# Patient Record
Sex: FEMALE | Race: WHITE | NOT HISPANIC OR LATINO | Employment: OTHER | URBAN - METROPOLITAN AREA
[De-identification: names, ages, dates, MRNs, and addresses within clinical notes are randomized per-mention and may not be internally consistent; named-entity substitution may affect disease eponyms.]

---

## 2024-08-18 ENCOUNTER — DOCUMENTATION ONLY (OUTPATIENT)
Dept: OTHER | Facility: CLINIC | Age: 39
End: 2024-08-18
Payer: MEDICARE

## 2024-09-11 ENCOUNTER — ANESTHESIA EVENT (OUTPATIENT)
Dept: SURGERY | Facility: CLINIC | Age: 39
End: 2024-09-11
Payer: MEDICARE

## 2024-09-11 RX ORDER — ESTRADIOL 1 MG/1
1 TABLET ORAL DAILY
COMMUNITY

## 2024-09-11 RX ORDER — PROGESTERONE 100 MG/1
100 CAPSULE ORAL DAILY
COMMUNITY

## 2024-09-11 RX ORDER — POLYETHYLENE GLYCOL 3350 17 G/17G
1 POWDER, FOR SOLUTION ORAL DAILY
COMMUNITY

## 2024-09-11 RX ORDER — FLUOXETINE 40 MG/1
40 CAPSULE ORAL DAILY
COMMUNITY

## 2024-09-11 RX ORDER — RISPERIDONE 1 MG/1
1 TABLET, ORALLY DISINTEGRATING ORAL 2 TIMES DAILY
COMMUNITY

## 2024-09-11 ASSESSMENT — ENCOUNTER SYMPTOMS: SEIZURES: 1

## 2024-09-12 RX ORDER — FENTANYL CITRATE 50 UG/ML
25 INJECTION, SOLUTION INTRAMUSCULAR; INTRAVENOUS EVERY 5 MIN PRN
Status: CANCELLED | OUTPATIENT
Start: 2024-09-12

## 2024-09-12 RX ORDER — ONDANSETRON 4 MG/1
4 TABLET, ORALLY DISINTEGRATING ORAL EVERY 30 MIN PRN
Status: CANCELLED | OUTPATIENT
Start: 2024-09-12

## 2024-09-12 RX ORDER — OXYCODONE HYDROCHLORIDE 5 MG/1
10 TABLET ORAL
Status: CANCELLED | OUTPATIENT
Start: 2024-09-12

## 2024-09-12 RX ORDER — DEXAMETHASONE SODIUM PHOSPHATE 4 MG/ML
4 INJECTION, SOLUTION INTRA-ARTICULAR; INTRALESIONAL; INTRAMUSCULAR; INTRAVENOUS; SOFT TISSUE
Status: CANCELLED | OUTPATIENT
Start: 2024-09-12

## 2024-09-12 RX ORDER — HYDROMORPHONE HYDROCHLORIDE 1 MG/ML
0.2 INJECTION, SOLUTION INTRAMUSCULAR; INTRAVENOUS; SUBCUTANEOUS EVERY 5 MIN PRN
Status: CANCELLED | OUTPATIENT
Start: 2024-09-12

## 2024-09-12 RX ORDER — NALOXONE HYDROCHLORIDE 0.4 MG/ML
0.1 INJECTION, SOLUTION INTRAMUSCULAR; INTRAVENOUS; SUBCUTANEOUS
Status: CANCELLED | OUTPATIENT
Start: 2024-09-12

## 2024-09-12 RX ORDER — OXYCODONE HYDROCHLORIDE 5 MG/1
5 TABLET ORAL
Status: CANCELLED | OUTPATIENT
Start: 2024-09-12

## 2024-09-12 RX ORDER — SODIUM CHLORIDE, SODIUM LACTATE, POTASSIUM CHLORIDE, CALCIUM CHLORIDE 600; 310; 30; 20 MG/100ML; MG/100ML; MG/100ML; MG/100ML
INJECTION, SOLUTION INTRAVENOUS CONTINUOUS
Status: CANCELLED | OUTPATIENT
Start: 2024-09-12

## 2024-09-12 RX ORDER — LABETALOL HYDROCHLORIDE 5 MG/ML
10 INJECTION, SOLUTION INTRAVENOUS
Status: CANCELLED | OUTPATIENT
Start: 2024-09-12

## 2024-09-12 RX ORDER — FENTANYL CITRATE 50 UG/ML
50 INJECTION, SOLUTION INTRAMUSCULAR; INTRAVENOUS EVERY 5 MIN PRN
Status: CANCELLED | OUTPATIENT
Start: 2024-09-12

## 2024-09-12 RX ORDER — ONDANSETRON 2 MG/ML
4 INJECTION INTRAMUSCULAR; INTRAVENOUS EVERY 30 MIN PRN
Status: CANCELLED | OUTPATIENT
Start: 2024-09-12

## 2024-09-12 RX ORDER — HYDROMORPHONE HYDROCHLORIDE 1 MG/ML
0.4 INJECTION, SOLUTION INTRAMUSCULAR; INTRAVENOUS; SUBCUTANEOUS EVERY 5 MIN PRN
Status: CANCELLED | OUTPATIENT
Start: 2024-09-12

## 2024-09-12 NOTE — ANESTHESIA PREPROCEDURE EVALUATION
"Anesthesia Pre-Procedure Evaluation    Patient: Jocelyne Mukherjee   MRN: [id number] : 1985        Procedure : Procedure(s):  Bilateral Dental Exam, Radiograph, Dental Restorations, Dental Extractions, Pulpotomy, Root Canal Therapy, Biopsy, Frenectomy, Gingivectomy, Alveoloplasty, Periodontal Therapy, Fluoride Varnish in the Mouth          No past medical history on file.   No past surgical history on file.   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     Neurologic: Comment: Cerebral palsy  Developmental delay  Autism spectrum disorder  Microcephaly    Takes carbamezapine    (+)       seizures, last seizure: ,                 Developmental delay,       Cardiovascular:  - neg cardiovascular ROS     METS/Exercise Tolerance:     Hematologic:       Musculoskeletal: Comment: Cerebral palsy      GI/Hepatic:  - neg GI/hepatic ROS     Renal/Genitourinary:  - neg Renal ROS     Endo:  - neg endo ROS     Psychiatric/Substance Use:     (+) psychiatric history anxiety       Infectious Disease:  - neg infectious disease ROS     Malignancy:  - neg malignancy ROS     Other:               OUTSIDE LABS:  CBC: No results found for: \"WBC\", \"HGB\", \"HCT\", \"PLT\"  BMP: No results found for: \"NA\", \"POTASSIUM\", \"CHLORIDE\", \"CO2\", \"BUN\", \"CR\", \"GLC\"  COAGS: No results found for: \"PTT\", \"INR\", \"FIBR\"  POC: No results found for: \"BGM\", \"HCG\", \"HCGS\"  HEPATIC: No results found for: \"ALBUMIN\", \"PROTTOTAL\", \"ALT\", \"AST\", \"GGT\", \"ALKPHOS\", \"BILITOTAL\", \"BILIDIRECT\", \"FORREST\"  OTHER: No results found for: \"PH\", \"LACT\", \"A1C\", \"KARAN\", \"PHOS\", \"MAG\", \"LIPASE\", \"AMYLASE\", \"TSH\", \"T4\", \"T3\", \"CRP\", \"SED\"    Anesthesia Plan    ASA Status:  2     "   Anesthesia Type: General.     - Airway: ETT   Induction: Inhalation.   Maintenance: Balanced.   Techniques and Equipment:     - Airway: Nasal CAMPOS, Video-Laryngoscope       Consents            Postoperative Care    Pain management: IV analgesics, Multi-modal analgesia.   PONV prophylaxis: Ondansetron (or other 5HT-3), Dexamethasone or Solumedrol     Comments:               Kavon Avery MD    I have reviewed the pertinent notes and labs in the chart from the past 30 days and (re)examined the patient.  Any updates or changes from those notes are reflected in this note.

## 2024-09-13 ENCOUNTER — HOSPITAL ENCOUNTER (OUTPATIENT)
Facility: CLINIC | Age: 39
Discharge: HOME | End: 2024-09-13
Attending: DENTIST | Admitting: DENTIST
Payer: MEDICARE

## 2024-09-13 ENCOUNTER — ANESTHESIA (OUTPATIENT)
Dept: SURGERY | Facility: CLINIC | Age: 39
End: 2024-09-13
Payer: MEDICARE

## 2024-09-13 VITALS
BODY MASS INDEX: 18.78 KG/M2 | DIASTOLIC BLOOD PRESSURE: 84 MMHG | WEIGHT: 95.68 LBS | TEMPERATURE: 97.3 F | OXYGEN SATURATION: 99 % | HEART RATE: 108 BPM | HEIGHT: 60 IN | RESPIRATION RATE: 14 BRPM | SYSTOLIC BLOOD PRESSURE: 130 MMHG

## 2024-09-13 PROCEDURE — 258N000003 HC RX IP 258 OP 636

## 2024-09-13 PROCEDURE — 360N000075 HC SURGERY LEVEL 2, PER MIN: Performed by: DENTIST

## 2024-09-13 PROCEDURE — 250N000009 HC RX 250

## 2024-09-13 PROCEDURE — 250N000013 HC RX MED GY IP 250 OP 250 PS 637: Performed by: DENTIST

## 2024-09-13 PROCEDURE — 999N000141 HC STATISTIC PRE-PROCEDURE NURSING ASSESSMENT: Performed by: DENTIST

## 2024-09-13 PROCEDURE — 250N000011 HC RX IP 250 OP 636

## 2024-09-13 PROCEDURE — 41899 UNLISTED PX DENTALVLR STRUX: CPT | Performed by: ANESTHESIOLOGY

## 2024-09-13 PROCEDURE — 710N000012 HC RECOVERY PHASE 2, PER MINUTE: Performed by: DENTIST

## 2024-09-13 PROCEDURE — 710N000010 HC RECOVERY PHASE 1, LEVEL 2, PER MIN: Performed by: DENTIST

## 2024-09-13 PROCEDURE — 250N000025 HC SEVOFLURANE, PER MIN: Performed by: DENTIST

## 2024-09-13 PROCEDURE — 370N000017 HC ANESTHESIA TECHNICAL FEE, PER MIN: Performed by: DENTIST

## 2024-09-13 PROCEDURE — 250N000011 HC RX IP 250 OP 636: Performed by: DENTIST

## 2024-09-13 RX ORDER — CEFAZOLIN SODIUM/WATER 2 G/20 ML
2 SYRINGE (ML) INTRAVENOUS
Status: COMPLETED | OUTPATIENT
Start: 2024-09-13 | End: 2024-09-13

## 2024-09-13 RX ORDER — SODIUM CHLORIDE, SODIUM LACTATE, POTASSIUM CHLORIDE, CALCIUM CHLORIDE 600; 310; 30; 20 MG/100ML; MG/100ML; MG/100ML; MG/100ML
INJECTION, SOLUTION INTRAVENOUS CONTINUOUS PRN
Status: DISCONTINUED | OUTPATIENT
Start: 2024-09-13 | End: 2024-09-13

## 2024-09-13 RX ORDER — PROPOFOL 10 MG/ML
INJECTION, EMULSION INTRAVENOUS PRN
Status: DISCONTINUED | OUTPATIENT
Start: 2024-09-13 | End: 2024-09-13

## 2024-09-13 RX ORDER — ACETAMINOPHEN 325 MG/1
975 TABLET ORAL ONCE
Status: COMPLETED | OUTPATIENT
Start: 2024-09-13 | End: 2024-09-13

## 2024-09-13 RX ORDER — ONDANSETRON 2 MG/ML
INJECTION INTRAMUSCULAR; INTRAVENOUS PRN
Status: DISCONTINUED | OUTPATIENT
Start: 2024-09-13 | End: 2024-09-13

## 2024-09-13 RX ORDER — FENTANYL CITRATE 50 UG/ML
INJECTION, SOLUTION INTRAMUSCULAR; INTRAVENOUS PRN
Status: DISCONTINUED | OUTPATIENT
Start: 2024-09-13 | End: 2024-09-13

## 2024-09-13 RX ORDER — LIDOCAINE 40 MG/G
CREAM TOPICAL
Status: DISCONTINUED | OUTPATIENT
Start: 2024-09-13 | End: 2024-09-13 | Stop reason: HOSPADM

## 2024-09-13 RX ORDER — SODIUM CHLORIDE, SODIUM LACTATE, POTASSIUM CHLORIDE, CALCIUM CHLORIDE 600; 310; 30; 20 MG/100ML; MG/100ML; MG/100ML; MG/100ML
INJECTION, SOLUTION INTRAVENOUS CONTINUOUS
Status: DISCONTINUED | OUTPATIENT
Start: 2024-09-13 | End: 2024-09-13 | Stop reason: HOSPADM

## 2024-09-13 RX ORDER — CHLORHEXIDINE GLUCONATE ORAL RINSE 1.2 MG/ML
SOLUTION DENTAL PRN
Status: DISCONTINUED | OUTPATIENT
Start: 2024-09-13 | End: 2024-09-13 | Stop reason: HOSPADM

## 2024-09-13 RX ORDER — MIDAZOLAM HYDROCHLORIDE 2 MG/ML
20 SYRUP ORAL ONCE
Status: COMPLETED | OUTPATIENT
Start: 2024-09-13 | End: 2024-09-13

## 2024-09-13 RX ORDER — DEXAMETHASONE SODIUM PHOSPHATE 4 MG/ML
INJECTION, SOLUTION INTRA-ARTICULAR; INTRALESIONAL; INTRAMUSCULAR; INTRAVENOUS; SOFT TISSUE PRN
Status: DISCONTINUED | OUTPATIENT
Start: 2024-09-13 | End: 2024-09-13

## 2024-09-13 RX ORDER — LIDOCAINE HYDROCHLORIDE 20 MG/ML
INJECTION, SOLUTION INFILTRATION; PERINEURAL PRN
Status: DISCONTINUED | OUTPATIENT
Start: 2024-09-13 | End: 2024-09-13

## 2024-09-13 RX ADMIN — PROPOFOL 100 MG: 10 INJECTION, EMULSION INTRAVENOUS at 07:38

## 2024-09-13 RX ADMIN — MIDAZOLAM 1 MG: 1 INJECTION INTRAMUSCULAR; INTRAVENOUS at 07:29

## 2024-09-13 RX ADMIN — ONDANSETRON 4 MG: 2 INJECTION INTRAMUSCULAR; INTRAVENOUS at 09:09

## 2024-09-13 RX ADMIN — DEXMEDETOMIDINE HYDROCHLORIDE 8 MCG: 100 INJECTION, SOLUTION INTRAVENOUS at 08:36

## 2024-09-13 RX ADMIN — Medication 2 G: at 07:46

## 2024-09-13 RX ADMIN — LIDOCAINE HYDROCHLORIDE 40 MG: 20 INJECTION, SOLUTION INFILTRATION; PERINEURAL at 07:38

## 2024-09-13 RX ADMIN — DEXAMETHASONE SODIUM PHOSPHATE 6 MG: 4 INJECTION, SOLUTION INTRAMUSCULAR; INTRAVENOUS at 08:12

## 2024-09-13 RX ADMIN — Medication 30 MG: at 07:38

## 2024-09-13 RX ADMIN — SODIUM CHLORIDE, POTASSIUM CHLORIDE, SODIUM LACTATE AND CALCIUM CHLORIDE: 600; 310; 30; 20 INJECTION, SOLUTION INTRAVENOUS at 07:33

## 2024-09-13 RX ADMIN — FENTANYL CITRATE 50 MCG: 50 INJECTION INTRAMUSCULAR; INTRAVENOUS at 07:38

## 2024-09-13 ASSESSMENT — ACTIVITIES OF DAILY LIVING (ADL)
ADLS_ACUITY_SCORE: 34
ADLS_ACUITY_SCORE: 31
ADLS_ACUITY_SCORE: 34
ADLS_ACUITY_SCORE: 34

## 2024-09-13 NOTE — ANESTHESIA PROCEDURE NOTES
Airway       Patient location during procedure: OR       Procedure Start/Stop Times: 9/13/2024 7:42 AM  Staff -        Anesthesiologist:  Dustin Shah MD       Resident/Fellow: Kavon Avery MD       Performed By: residentIndications and Patient Condition       Indications for airway management: kristyn-procedural       Induction type:intravenous       Mask difficulty assessment: 1 - vent by mask    Final Airway Details       Final airway type: endotracheal airway       Successful airway: ETT - single, Nasal and CAMPOS  Endotracheal Airway Details        ETT size (mm): 6.0       Cuffed: yes       Successful intubation technique: direct laryngoscopy       DL Blade Type: MAC 3       Grade View of Cords: 1       Adjucts: magill forceps       Position: Center       Measured from: gums/teeth       Bite block used: None    Post intubation assessment        Placement verified by: capnometry, equal breath sounds and chest rise        Number of attempts at approach: 1       Secured with: tape       Ease of procedure: easy       Dentition: Intact    Medication(s) Administered   Medication Administration Time: 9/13/2024 7:42 AM

## 2024-09-13 NOTE — DISCHARGE INSTRUCTIONS
To contact a doctor, call Dr. Mitchell Almonte at dental clinic [phone]  or:     [phone] and ask for the Resident On Call for:          Dental (answered 24 hours a day)   Emergency Departments:  [facility] Adult Emergency Department: [phone]

## 2024-09-13 NOTE — ANESTHESIA CARE TRANSFER NOTE
Patient: Jocelyne Mukherjee    Procedure: Procedure(s):  Bilateral Dental Exam, Radiograph, Periodontal Therapy, Fluoride Varnish in the Mouth       Diagnosis: Dental caries [K02.9]  Diagnosis Additional Information: No value filed.    Anesthesia Type:   General     Note:    Oropharynx: oropharynx clear of all foreign objects and spontaneously breathing  Level of Consciousness: drowsy  Oxygen Supplementation: face mask  Level of Supplemental Oxygen (L/min / FiO2): 6  Independent Airway: airway patency satisfactory and stable  Dentition: dentition unchanged  Vital Signs Stable: post-procedure vital signs reviewed and stable  Report to RN Given: handoff report given  Patient transferred to: PACU    Handoff Report: Identifed the Patient, Identified the Reponsible Provider, Reviewed the pertinent medical history, Discussed the surgical course, Reviewed Intra-OP anesthesia mangement and issues during anesthesia, Set expectations for post-procedure period and Allowed opportunity for questions and acknowledgement of understanding      Vitals:  Vitals Value Taken Time   /88 09/13/24 0939   Temp     Pulse 102 09/13/24 0944   Resp 17 09/13/24 0944   SpO2 99 % 09/13/24 0942   Vitals shown include unfiled device data.    Electronically Signed By: Kavon Avery MD  September 13, 2024  9:45 AM

## 2024-09-13 NOTE — OP NOTE
Operative Note    Subjective:   Jocelyne Mukherjee ([id number]) is a 39 year old year old female who has been diagnosed with autism , microcephalus and cerebral palsy.  The patient was evaluated at [facility] in [place], [place] and was found to be uncooperative.  Due to this patient's inability to cooperate in a dental setting, it was necessary for dental treatment to be completed under general anesthesia in the operating room.  The patient presents to [facility] with staff for dental procedures under General Anesthesia.  The patient was taken to preinduction where an IV was  started and then transported to the operating room.   Nasoendotracheal intubation was accomplished without complications.     Last Solid Intake: 9/12 pm   Last Liquid intake: 9/12 pm  Staff  denies that patient has cough, cold, nasal congestion.    Staff reports:  Oral Hygiene Brushes: 2x per day, staff assists; Flossing limited  Patient Status:  H+P was performed by registered nurse Erica Rodriguez, on 8/6/2024.  There are no contraindications to the planned procedure.  Allergies: Patient has no known allergies.  Labs:  HGB:13.5 Platelets: 284, INR, 1.1, CXR not indicated, EKG: not indicated.   Vital Signs: /71 (BP Location: Left arm)   Pulse (!) 124   Temp 97.7  F (36.5  C) (Temporal)   Ht 1.524 m (5')   Wt 43.4 kg (95 lb 10.9 oz)   LMP  (LMP Unknown)   SpO2 98%   BMI 18.69 kg/m    Consents signed and in the chart.     Medications:     Medications Prior to Admission   Medication Sig Dispense Refill Last Dose    carBAMazepine (TEGRETOL-XR PO) Take 400 mg by mouth 2 times daily.   9/12/2024 at 2100    estradiol (ESTRACE) 1 MG tablet Take 1 mg by mouth daily.   9/12/2024 at 0900    FLUoxetine (PROZAC) 40 MG capsule Take 40 mg by mouth daily.   9/12/2024 at 0900    Multiple Vitamin (MULTIVITAMIN ADULT PO) Take by mouth.   Past Week    polyethylene glycol (MIRALAX) 17 g packet Take 1 packet by mouth  daily.   9/12/2024 at 0900    progesterone (PROMETRIUM) 100 MG capsule Take 100 mg by mouth daily.   9/12/2024 at 2100    risperiDONE (RISPERDAL M-TABS) 1 MG ODT Take 1 mg by mouth 2 times daily.   9/12/2024 at 2100                        Objective:  Exam: Extra oral: no lesions or concerns  IntraOral Soft Tissue Exam: Gingival Inflammation generealized , . Periodontal Probing depths charted, Hard Tissue Exam as Charted.    Radiographic Findings: FMX was taken for diagnostic purposes. FMX reveals: Generalized, Moderate, Bone Loss Present Throughout.    Clinical Findings:   Caries no decay   Missing Teeth: 32, 31 not erupted  Restorations charted  Other Clinical Findings: 4 lingual positioned, 13 lingual positioned  Plaque: Moderate  Calculus: Heavy  Bleeding: Heavy    Assessment:   Caries:  inactive   Caries Risk: low  Periodontal Diagnosis:  Generalized moderate chronic periodontitis  OH:  Fair   OHI Provided: Chula twice daily (increase frequency), Staff should perform oral cares, and Direct brush at gumline  Treatment Recommendations:    Procedure:  Procedure(s):  Bilateral Dental Exam, Radiograph, Periodontal Therapy, Fluoride Varnish in the Mouth   Time Out completed prior to start of procedure. A Guaze throat pack placed; Exam completed; FMX taken;  Oral tissues thoroughly brushed with CHX 0.12%; Scale and Root Plane all 4 quadrants with cavitron followed by hand instrumentation.  Polish.      Restorations:  no decay    Anesthetic Given:  none,   Estimated blood loss: 5 ml    IV Prescriptions Given:  none  POI:  Given to: staff.    RTC:  3-6mrc; Return to OR in 3 to 5 years  Circulator: Gayle Mora RN  Relief Circulator: Lisa Garcia RN and Anesthesiologist: Dustin Shah MD  CRNA: Destinee Alcala APRN CRNA  Anesthesia Resident: Kavon Avery MD Flaherty, James Morgan, MD Alison Nickelson CDA Ashley Parker LDA present.   Mitchell Almonte DDS,  September 13, 2024

## 2024-09-13 NOTE — ANESTHESIA POSTPROCEDURE EVALUATION
Patient: Jocelyne Mukherjee    Procedure: Procedure(s):  Bilateral Dental Exam, Radiograph, Periodontal Therapy, Fluoride Varnish in the Mouth       Anesthesia Type:  General    Note:  Disposition: Outpatient   Postop Pain Control: Uneventful            Sign Out: Well controlled pain   PONV: No   Neuro/Psych: Uneventful            Sign Out: Acceptable/Baseline neuro status   Airway/Respiratory: Uneventful            Sign Out: Acceptable/Baseline resp. status   CV/Hemodynamics: Uneventful            Sign Out: Acceptable CV status; No obvious hypovolemia; No obvious fluid overload   Other NRE: NONE   DID A NON-ROUTINE EVENT OCCUR?            Last vitals:  Vitals Value Taken Time   /84 09/13/24 1015   Temp 36.3  C (97.3  F) 09/13/24 1015   Pulse 108 09/13/24 1000   Resp 14 09/13/24 1015   SpO2 98 % 09/13/24 1022   Vitals shown include unfiled device data.    Electronically Signed By: Dustin Shah MD  September 13, 2024  10:30 AM

## 2024-09-13 NOTE — BRIEF OP NOTE
Dental Brief Operative Note    Procedure:  Comprehensive exam, Full mouth radiographs,  4 quadrants of scaling and root planing and cleaning, no decay , fluoride varnish    Surgeon: Mitchell Almonte DDS    Assistant: Lizzie DOVE    Anesthesia: General anesthesia with nasal oral intubation     Estimated blood loss: 5 ml    Total IV fluids: 400 cc      Complications:  None    Condition: Patient taken to recovery in stable condition.    Circulator: Gayle Mora RN and Anesthesiologist: Dustin Shah MD  Anesthesia Resident: Kavon Avery MD Flaherty, James Morgan, MD Matthew Rindal, DDS,  September 13, 2024

## (undated) DEVICE — MARKING PEN REG/FINE DUALT TIP WITH RULER 1437SR-100

## (undated) DEVICE — BRUSH SURGICAL SCRUB PLAIN STERILE 4454A

## (undated) DEVICE — STRAP KNEE/BODY 31143004

## (undated) DEVICE — SOLIDIFIER (USE FOR UP TO 1500CC) MSOLID1500

## (undated) DEVICE — LIGHT HANDLE X2

## (undated) DEVICE — DRAPE STERI TOWEL LG 1010

## (undated) DEVICE — POSITIONER ARMBOARD FOAM 1PAIR LF FP-ARMB1

## (undated) DEVICE — SPONGE PACK THROAT 2X18" 31-708

## (undated) DEVICE — GOWN XLG DISP 9545

## (undated) DEVICE — SOL NACL 0.9% IRRIG 1000ML BOTTLE 2F7124

## (undated) DEVICE — LINEN ORTHO PACK 5446

## (undated) DEVICE — BASIN SET MAJOR

## (undated) DEVICE — ANTIFOG SOLUTION W/FOAM PAD 31142527

## (undated) DEVICE — SOL WATER IRRIG 1000ML BOTTLE 2F7114

## (undated) DEVICE — SPONGE RAY-TEC 4X8" 7318

## (undated) DEVICE — COVER PROBE ULTRASOUND 3D W/GEL 5X96" LF 20-P3D596

## (undated) DEVICE — DRAPE MAYO STAND 23X54 8337

## (undated) DEVICE — TOOTHBRUSH ADULT NON STERILE MDS136850

## (undated) DEVICE — PACK SET-UP STD 9102

## (undated) RX ORDER — OXYMETAZOLINE HYDROCHLORIDE 0.05 G/100ML
SPRAY NASAL
Status: DISPENSED
Start: 2024-09-13

## (undated) RX ORDER — LIDOCAINE/PRILOCAINE 2.5 %-2.5%
CREAM (GRAM) TOPICAL
Status: DISPENSED
Start: 2024-09-13

## (undated) RX ORDER — CEFAZOLIN SODIUM/WATER 2 G/20 ML
SYRINGE (ML) INTRAVENOUS
Status: DISPENSED
Start: 2024-09-13

## (undated) RX ORDER — MIDAZOLAM HYDROCHLORIDE 2 MG/ML
SYRUP ORAL
Status: DISPENSED
Start: 2024-09-13

## (undated) RX ORDER — CHLORHEXIDINE GLUCONATE ORAL RINSE 1.2 MG/ML
SOLUTION DENTAL
Status: DISPENSED
Start: 2024-09-13

## (undated) RX ORDER — FENTANYL CITRATE 50 UG/ML
INJECTION, SOLUTION INTRAMUSCULAR; INTRAVENOUS
Status: DISPENSED
Start: 2024-09-13

## (undated) RX ORDER — ACETAMINOPHEN 325 MG/1
TABLET ORAL
Status: DISPENSED
Start: 2024-09-13